# Patient Record
Sex: MALE | Employment: FULL TIME | URBAN - METROPOLITAN AREA
[De-identification: names, ages, dates, MRNs, and addresses within clinical notes are randomized per-mention and may not be internally consistent; named-entity substitution may affect disease eponyms.]

---

## 2019-04-22 ENCOUNTER — OFFICE VISIT (OUTPATIENT)
Dept: SURGERY | Facility: CLINIC | Age: 29
End: 2019-04-22
Payer: COMMERCIAL

## 2019-04-22 VITALS
SYSTOLIC BLOOD PRESSURE: 144 MMHG | WEIGHT: 183 LBS | BODY MASS INDEX: 26.2 KG/M2 | DIASTOLIC BLOOD PRESSURE: 68 MMHG | HEART RATE: 72 BPM | HEIGHT: 70 IN

## 2019-04-22 DIAGNOSIS — M79.605 PAIN IN BOTH LOWER EXTREMITIES: ICD-10-CM

## 2019-04-22 DIAGNOSIS — M79.604 PAIN IN BOTH LOWER EXTREMITIES: ICD-10-CM

## 2019-04-22 DIAGNOSIS — R20.0 NUMBNESS AND TINGLING OF FOOT: Primary | ICD-10-CM

## 2019-04-22 DIAGNOSIS — R20.2 NUMBNESS AND TINGLING OF FOOT: Primary | ICD-10-CM

## 2019-04-22 PROCEDURE — 99203 OFFICE O/P NEW LOW 30 MIN: CPT | Performed by: PHYSICIAN ASSISTANT

## 2019-04-22 RX ORDER — MELOXICAM 15 MG/1
1 TABLET ORAL DAILY
Refills: 2 | COMMUNITY
Start: 2019-04-16

## 2019-04-22 NOTE — PROGRESS NOTES
Location of Pain:  Pt states that he has bilateral quad pain. Pt states that he has bilateral pain in th calves and bottoms of the feet. Pt states that he has bilateral numbness and tingling. Pt states no issues with weakness.  Pt states that he has no issu

## 2019-04-22 NOTE — H&P
Neurosurgery Clinic Visit  2019    [address] PCP:  No primary care provider on file.  1990 MRN [id number]       CC:   Foot tingling/numbness/spasms    HPI:    Jeanna Parra is a very pleasant 33year old male who presents with 2 months of LE sym Substance and Sexual Activity      Alcohol use: Yes        Comment: Weekend       Drug use: Never    No family history on file. ROS:  A 10-point system was reviewed. Pertinent positives and negatives are noted in HPI.    Physical Exam:   04/22/19  0940 tunnel syndrome, recommend seeing podiatry. Imaging was reviewed with the patient and explained in detail. The diagnosis, treatment options, and expectations were discussed. All questions were answered to satisfaction.   Total visit time = 30 Minutes;

## 2019-05-02 ENCOUNTER — TELEPHONE (OUTPATIENT)
Dept: SURGERY | Facility: CLINIC | Age: 29
End: 2019-05-02

## 2019-05-02 NOTE — TELEPHONE ENCOUNTER
rcvd MRI lumbar (4/24/19),xr tib/fib L&R (4/16/19),xr foot lt(3/6/19) (CD ONLY). Uploaded into PACS.  Mailed CD back to patient